# Patient Record
Sex: FEMALE | Race: WHITE | NOT HISPANIC OR LATINO | Employment: FULL TIME | URBAN - NONMETROPOLITAN AREA
[De-identification: names, ages, dates, MRNs, and addresses within clinical notes are randomized per-mention and may not be internally consistent; named-entity substitution may affect disease eponyms.]

---
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
## 2021-06-19 ENCOUNTER — OFFICE VISIT (OUTPATIENT)
Dept: FAMILY MEDICINE | Facility: OTHER | Age: 21
End: 2021-06-19
Attending: SURGERY
Payer: COMMERCIAL

## 2021-06-19 VITALS
HEART RATE: 92 BPM | BODY MASS INDEX: 26.94 KG/M2 | TEMPERATURE: 97.6 F | SYSTOLIC BLOOD PRESSURE: 118 MMHG | WEIGHT: 146.4 LBS | DIASTOLIC BLOOD PRESSURE: 80 MMHG | HEIGHT: 62 IN | OXYGEN SATURATION: 97 % | RESPIRATION RATE: 18 BRPM

## 2021-06-19 DIAGNOSIS — H66.003 NON-RECURRENT ACUTE SUPPURATIVE OTITIS MEDIA OF BOTH EARS WITHOUT SPONTANEOUS RUPTURE OF TYMPANIC MEMBRANES: Primary | ICD-10-CM

## 2021-06-19 DIAGNOSIS — H61.23 BILATERAL IMPACTED CERUMEN: ICD-10-CM

## 2021-06-19 PROCEDURE — 99213 OFFICE O/P EST LOW 20 MIN: CPT | Performed by: NURSE PRACTITIONER

## 2021-06-19 ASSESSMENT — MIFFLIN-ST. JEOR: SCORE: 1379.38

## 2021-06-19 ASSESSMENT — PAIN SCALES - GENERAL: PAINLEVEL: MILD PAIN (3)

## 2021-06-19 NOTE — NURSING NOTE
"Chief Complaint   Patient presents with     Ear Problem     bilateral ear pain started a few days ago. Yesterday she woke up at 1 am crying from the pain.     Initial There were no vitals taken for this visit. Estimated body mass index is 24.72 kg/m  as calculated from the following:    Height as of 4/21/16: 1.562 m (5' 1.5\").    Weight as of 4/24/16: 60.3 kg (133 lb).         Medication Reconciliation: Complete      Jovan Topete LPN   "

## 2021-06-19 NOTE — PROGRESS NOTES
"ASSESSMENT/PLAN:  1. Non-recurrent acute suppurative otitis media of both ears without spontaneous rupture of tympanic membranes    - amoxicillin-clavulanate (AUGMENTIN) 875-125 MG tablet; Take 1 tablet by mouth 2 times daily for 10 days  Dispense: 20 tablet; Refill: 0    2. Bilateral impacted cerumen    - REMOVE IMPACTED CERUMEN      COSMO Aldana performed an ear lavage of bilateral auditory canals, this was successful.     Discussed with the patient that she appears to have otitis media of the left TM with the start of otitis media of the right TM. The patient was prescribed Augmentin BID x 10 days for otitis media.     Encouraged fluids    May use over-the-counter Tylenol or ibuprofen PRN    Discussed warning signs/symptoms indicative of need to f/u    Follow up if symptoms persist or worsen or concerns      I explained my diagnostic considerations and recommendations to the patient, who voiced understanding and agreement with the treatment plan. All questions were answered. We discussed potential side effects of any prescribed or recommended therapies, as well as expectations for response to treatments.        HPI:    Rylee A Mindrum is a 20 year old female  who presents to [facility] today for bilateral otalgia. The patient presents to the clinic today with her mother. The symptoms started a couple of days ago. However, the pain worsened last night. \"states that it feels bubbly and has discomfort\". Stated that it was a sharper pain last night. The patient has a history of bilateral PE tubes years ago. History of otitis media as a child. Denies fevers. The patient has been having rhinitis and congestion due to having a cat in her home. She took ibuprofen today for the pain, this did help.     Past Medical History:   Diagnosis Date     Ankyloglossia     Tight frenulum under her tongue     Otitis media     12/14/04,Presumed otitis media-ear pain with cerumen occluding tympanic membranes, treated with amoxicillin " "and ciprofloxacin drops     Past Surgical History:   Procedure Laterality Date     ADENOIDECTOMY      No Comments Provided     MYRINGOTOMY, INSERT TUBE, COMBINED      06/04, 2005     OTHER SURGICAL HISTORY      [id number],FRENOTOMY,Tongue frenulum clipping     Social History     Tobacco Use     Smoking status: Never Smoker     Smokeless tobacco: Never Used   Substance Use Topics     Alcohol use: No     Current Outpatient Medications   Medication Sig Dispense Refill     fexofenadine (ALLEGRA) 180 MG tablet Take 180 mg by mouth daily with food       Allergies   Allergen Reactions     Seasonal Other (See Comments)         Past medical history, past surgical history, current medications and allergies reviewed and accurate to the best of my knowledge.        ROS:  Refer to HPI    /80   Pulse 92   Temp 97.6  F (36.4  C) (Tympanic)   Resp 18   Ht 1.562 m (5' 1.5\")   Wt 66.4 kg (146 lb 6.4 oz)   LMP 06/12/2021   SpO2 97%   BMI 27.21 kg/m      EXAM:  General Appearance: Well appearing female, appropriate appearance for age. No acute distress  Ears: Left TM intact, unable to visualize bony landmarks, erythema present, bulging present, yellow purulence noted behind left TM.  Right TM intact, unable to visualize bony landmarks, erythema present,no bulging, no purulence.  Left auditory canal cerumen present.  Right auditory canal cerumen present.  Normal external ears, non tender.  Orophayrnx: moist mucous membranes, posterior pharynx without erythema, tonsils without hypertrophy, no erythema, no exudates or petechiae, no post nasal drip seen, no trismus, voice clear.    Neck: supple without adenopathy  Respiratory: normal chest wall and respirations.  Normal effort.  Clear to auscultation bilaterally, no wheezing, crackles or rhonchi.  No increased work of breathing.  No cough appreciated.  Cardiac: RRR with no murmurs  Psychological: normal affect, alert, oriented, and pleasant.         "

## 2021-06-19 NOTE — PATIENT INSTRUCTIONS
Patient Education     Common Middle Ear Problems  Middle ear problems may be caused by something that occurs at birth or right after birth (congenital). This may be an inherited condition. Or it may be due to medicines or to a viral infection such as hepatitis, HIV, syphilis, or cytomegalovirus. Over time, certain growths or bone disease can also harm the middle ear. Left untreated, middle ear problems often lead to lifelong (permanent) hearing loss.  The ear has 3 main parts: the outer ear, middle ear, and inner ear. The middle ear is made up of:    The eardrum (tympanic membrane)    An air-filled space with bones (ossicles) that link the eardrum to the inner ear     There are 3 types of hearing loss:    Conductive hearing loss. This is caused by anything that limits outside sound from getting into the inner ear.    Sensorineural hearing loss. This type affects the inner ear (cochlea) or the auditory nerve.    Mixed hearing loss. This is a combination of conductive and sensorineural hearing loss.    Injury, infection, certain growths, or bone disease can cause your symptoms. A ruptured eardrum or a long-lasting (chronic) ear infection may be painful and decrease hearing.  Symptoms    Hearing loss in one or both ears    Fluid, often smelly, draining from the ear    Mild pain or pressure in the ear    Ringing in the ear    Types of hearing loss  Conductive hearing loss  Sound waves may be disrupted before they get to the inner ear. If this happens, you may have conductive hearing loss. Loud sounds may be muffled. And it can be hard to hear soft sounds.  Causes can include:    Fluid in the middle ear    Ear infection (otitis media)    Infection in the ear canal (swimmer s ear or external otitis)    Earwax in the ear canal    Noncancer (benign) tumors blocking the outer or middle ear    A hole in your eardrum (perforated eardrum)    Something stuck in the outer ear    Structural problem in the outer or middle  ear  This type of hearing loss can often be treated with medicine or surgery.  Sensorineural hearing loss  This is the most common type of lifelong hearing loss. It occurs after damage to the inner ear. It can also occur when there are problems with the nerves that travel from the inner ear to the brain. You may find it hard to hear soft sounds. Louder sounds may not be clear. Or they may be muffled.  Causes can include:    Illnesses    Certain medicines that damage the ear    Age-related hearing loss (presbycusis)    Family history of hearing loss    Head injury    Loud noise exposure    Structural problem in the inner ear  In some cases it may be hard to know the cause of sensorineural hearing loss. Some metabolic disorders, such as diabetes, have been linked to it. If your hearing loss is unexplained, you may need tests to help find the cause. These can include:    Blood sugar level tests    Complete blood count (CBC)    Thyroid function tests    Syphilis blood tests  In most cases, a sensorineural hearing loss can t be fixed with medicine or surgery. Hearing aids may be needed.  Mixed hearing loss  This type occurs when a conductive hearing loss happens at the same time as a sensorineural hearing loss. It is a problem in your outer or middle ear and in your inner ear. You may not hear as well in one or both ears.  Treatment for mixed hearing loss may be a combination of medicine or surgery, as well as hearing aids.  Radha last reviewed this educational content on 7/1/2019 2000-2021 [employer]. All rights reserved. This information is not intended as a substitute for professional medical care. Always follow your healthcare professional's instructions.

## 2022-02-23 ENCOUNTER — TRANSFERRED RECORDS (OUTPATIENT)
Dept: MULTI SPECIALTY CLINIC | Facility: CLINIC | Age: 22
End: 2022-02-23

## 2022-02-23 LAB
HPV ABSTRACT: NORMAL
PAP-ABSTRACT: NORMAL

## 2023-01-31 ENCOUNTER — TRANSFERRED RECORDS (OUTPATIENT)
Dept: MULTI SPECIALTY CLINIC | Facility: CLINIC | Age: 23
End: 2023-01-31

## 2023-01-31 LAB
C TRACH DNA SPEC QL PROBE+SIG AMP: NOT DETECTED
N GONORRHOEA DNA SPEC QL PROBE+SIG AMP: NOT DETECTED
SPECIMEN DESCRIP: NORMAL
SPECIMEN DESCRIPTION: NORMAL

## 2023-09-26 ENCOUNTER — OFFICE VISIT (OUTPATIENT)
Dept: FAMILY MEDICINE | Facility: OTHER | Age: 23
End: 2023-09-26
Attending: STUDENT IN AN ORGANIZED HEALTH CARE EDUCATION/TRAINING PROGRAM
Payer: COMMERCIAL

## 2023-09-26 VITALS
BODY MASS INDEX: 32.12 KG/M2 | OXYGEN SATURATION: 100 % | RESPIRATION RATE: 16 BRPM | SYSTOLIC BLOOD PRESSURE: 114 MMHG | DIASTOLIC BLOOD PRESSURE: 76 MMHG | HEART RATE: 101 BPM | TEMPERATURE: 97.6 F | WEIGHT: 172.8 LBS

## 2023-09-26 DIAGNOSIS — J02.9 ACUTE PHARYNGITIS, UNSPECIFIED ETIOLOGY: Primary | ICD-10-CM

## 2023-09-26 DIAGNOSIS — J02.0 STREPTOCOCCAL PHARYNGITIS: ICD-10-CM

## 2023-09-26 LAB
GROUP A STREP BY PCR: DETECTED
SARS-COV-2 RNA RESP QL NAA+PROBE: NEGATIVE

## 2023-09-26 PROCEDURE — G0463 HOSPITAL OUTPT CLINIC VISIT: HCPCS | Performed by: NURSE PRACTITIONER

## 2023-09-26 PROCEDURE — C9803 HOPD COVID-19 SPEC COLLECT: HCPCS | Performed by: NURSE PRACTITIONER

## 2023-09-26 PROCEDURE — 87651 STREP A DNA AMP PROBE: CPT | Mod: ZL | Performed by: NURSE PRACTITIONER

## 2023-09-26 PROCEDURE — 87635 SARS-COV-2 COVID-19 AMP PRB: CPT | Mod: ZL | Performed by: NURSE PRACTITIONER

## 2023-09-26 PROCEDURE — 99203 OFFICE O/P NEW LOW 30 MIN: CPT | Performed by: NURSE PRACTITIONER

## 2023-09-26 RX ORDER — PENICILLIN V POTASSIUM 500 MG/1
500 TABLET, FILM COATED ORAL 2 TIMES DAILY
Qty: 20 TABLET | Refills: 0 | Status: SHIPPED | OUTPATIENT
Start: 2023-09-26 | End: 2023-10-06

## 2023-09-26 RX ORDER — HYDROCORTISONE 25 MG/G
CREAM TOPICAL
COMMUNITY
Start: 2022-07-19

## 2023-09-26 ASSESSMENT — PAIN SCALES - GENERAL: PAINLEVEL: MODERATE PAIN (4)

## 2023-09-26 NOTE — PROGRESS NOTES
ASSESSMENT/PLAN:    I have reviewed the nursing notes.  I have reviewed the findings, diagnosis, plan and need for follow up with the patient.    1. Acute pharyngitis, unspecified etiology  - Symptomatic COVID-19 Virus (Coronavirus) by PCR Nose  - Group A Streptococcus PCR Throat Swab  Strep and COVID test pending.  If positive for strep we will treat antibiotics.  If positive for COVID can discuss antiviral treatment patient.  She is 6 weeks pregnant.  Discussed that if she is positive she will need to tell her OB at her first appointment as additional ultrasounds are often recommended in women who have had COVID in early pregnancy.    - Symptomatic treatment - Encouraged fluids, salt water gargles, honey (only if greater than 1 year in age due to risk of botulism), elevation, humidifier, sinus rinse/netti pot, lozenges, tea, topical vapor rub, popsicles, rest, etc   -May use over-the-counter Tylenol or ibuprofen PRN    2. Streptococcal pharyngitis  - penicillin V (VEETID) 500 MG tablet; Take 1 tablet (500 mg) by mouth 2 times daily for 10 days  Dispense: 20 tablet; Refill: 0      Follow up if symptoms persist or worsen or concerns    I explained my diagnostic considerations and recommendations to the patient, who voiced understanding and agreement with the treatment plan. All questions were answered. We discussed potential side effects of any prescribed or recommended therapies, as well as expectations for response to treatments.    Britney Oh NP  9/26/2023  4:27 PM    HPI:  Rylee A Mindrum is a 22 year old female who presents to [facility] today for concerns of sore throat. She is 6 weeks pregnant. Has been gaggy with pregnancy. She is here with her mom today. Works at [employer] and there has been a lot of strep around the classroom.   No fevers.   Slight cough. No shortness of breath. Has not taken a covid test and is agreeable to this.     ROS Otherwise negative.     Past Medical History:   Diagnosis Date     Ankyloglossia     Tight frenulum under her tongue    Otitis media     12/14/04,Presumed otitis media-ear pain with cerumen occluding tympanic membranes, treated with amoxicillin and ciprofloxacin drops     Past Surgical History:   Procedure Laterality Date    ADENOIDECTOMY      No Comments Provided    MYRINGOTOMY, INSERT TUBE, COMBINED      06/04, 2005    OTHER SURGICAL HISTORY      158124,FRENOTOMY,Tongue frenulum clipping     Social History     Tobacco Use    Smoking status: Never    Smokeless tobacco: Never   Substance Use Topics    Alcohol use: No     Current Outpatient Medications   Medication Sig Dispense Refill    fexofenadine (ALLEGRA) 180 MG tablet Take 180 mg by mouth daily with food      hydrocortisone, Perianal, (ANUSOL-HC) 2.5 % cream Insert  into the rectum as needed for Other (hemorrhoids).      penicillin V (VEETID) 500 MG tablet Take 1 tablet (500 mg) by mouth 2 times daily for 10 days 20 tablet 0     Allergies   Allergen Reactions    No Clinical Screening - See Comments Other (See Comments)    Seasonal Other (See Comments)     Past medical history, past surgical history, current medications and allergies reviewed and accurate to the best of my knowledge.      ROS:  Refer to HPI    /76   Pulse 101   Temp 97.6  F (36.4  C) (Tympanic)   Resp 16   Wt 78.4 kg (172 lb 12.8 oz)   SpO2 100%   BMI 32.12 kg/m      EXAM:  General Appearance: Well appearing 22 year old female, appropriate appearance for age. No acute distress   Ears: Left TM intact, translucent with bony landmarks appreciated, no erythema, no effusion, no bulging, no purulence.  Right TM intact, translucent with bony landmarks appreciated, no erythema, no effusion, no bulging, no purulence.  Left auditory canal clear.  Right auditory canal clear.  Normal external ears, non tender.  Eyes: conjunctivae normal without erythema or irritation, corneas clear, no drainage or crusting, no eyelid swelling, pupils equal   Oropharynx: moist  mucous membranes,  + posterior pharynx with erythema, tonsils symmetric, no erythema, no exudates or petechiae, no post nasal drip seen, no trismus, voice clear.    Nose:  Bilateral nares: no erythema, no edema, no drainage or congestion   Neck: supple without adenopathy  Respiratory: normal chest wall and respirations.  Normal effort.  Clear to auscultation bilaterally, no wheezing, crackles or rhonchi.  No increased work of breathing.  No cough appreciated.  Cardiac: RRR with no murmurs  Musculoskeletal:  Equal movement of bilateral upper extremities.  Equal movement of bilateral lower extremities.  Normal gait.    Dermatological: no rashes noted of exposed skin  Neuro: Alert and oriented to person, place, and time.    Psychological: normal affect, alert, oriented, and pleasant.     Results for orders placed or performed in visit on 09/26/23   Symptomatic COVID-19 Virus (Coronavirus) by PCR Nose     Status: Normal    Specimen: Nose; Swab   Result Value Ref Range    SARS CoV2 PCR Negative Negative    Narrative    Testing was performed using the Xpert Xpress SARS-CoV-2 Assay on the [employer] Gene[employer]Xpert Instrument Systems. Additional information about this Emergency Use Authorization (EUA) assay can be found via the Lab Guide. This test should be ordered for the detection of SARS-CoV-2 in individuals who meet SARS-CoV-2 clinical and/or epidemiological criteria as well as from individuals without symptoms or other reasons to suspect COVID-19. Test performance for asymptomatic patients has only been established in anterior nasal swab specimens. This test is for in vitro diagnostic use under the FDA EUA for laboratories certified under CLIA to perform high complexity testing. This test has not been FDA cleared or approved. A negative result does not rule out the presence of PCR inhibitors in the specimen or target RNA concentration below the limit of detection for the assay. The possibility of a false negative should be  considered if the patient's recent exposure or clinical presentation suggests COVID-19. This test was validated by [facility]. This laboratory is certified under the Clinical Laboratory Improvement Amendments (CLIA) as qualified to perform high complexity clinical laboratory testing.   Group A Streptococcus PCR Throat Swab     Status: Abnormal    Specimen: Throat; Swab   Result Value Ref Range    Group A strep by PCR Detected (A) Not Detected    Narrative    The Xpert Xpress Strep A test, performed on the [employer] Systems, is a rapid, qualitative in vitro diagnostic test for the detection of Streptococcus pyogenes (Group A ß-hemolytic Streptococcus, Strep A) in throat swab specimens from patients with signs and symptoms of pharyngitis. The Xpert Xpress Strep A test can be used as an aid in the diagnosis of Group A Streptococcal pharyngitis. The assay is not intended to monitor treatment for Group A Streptococcus infections. The Xpert Xpress Strep A test utilizes an automated real-time polymerase chain reaction (PCR) to detect Streptococcus pyogenes DNA.

## 2023-09-26 NOTE — NURSING NOTE
Patient presents to clinic for concern of sore throat  /76   Pulse 101   Temp 97.6  F (36.4  C) (Tympanic)   Resp 16   Wt 78.4 kg (172 lb 12.8 oz)   SpO2 100%   BMI 32.12 kg/m    Erika Parks LPN on 9/26/2023 at 4:20 PM

## 2024-03-16 ENCOUNTER — HOSPITAL ENCOUNTER (OUTPATIENT)
Facility: OTHER | Age: 24
Discharge: HOME | End: 2024-03-16
Attending: OBSTETRICS & GYNECOLOGY | Admitting: OBSTETRICS & GYNECOLOGY
Payer: COMMERCIAL

## 2024-03-16 ENCOUNTER — HOSPITAL ENCOUNTER (OUTPATIENT)
Facility: OTHER | Age: 24
End: 2024-03-16
Admitting: OBSTETRICS & GYNECOLOGY
Payer: COMMERCIAL

## 2024-03-16 VITALS
BODY MASS INDEX: 32.47 KG/M2 | TEMPERATURE: 97.6 F | SYSTOLIC BLOOD PRESSURE: 111 MMHG | HEART RATE: 96 BPM | DIASTOLIC BLOOD PRESSURE: 56 MMHG | RESPIRATION RATE: 16 BRPM | OXYGEN SATURATION: 98 % | HEIGHT: 61 IN | WEIGHT: 172 LBS

## 2024-03-16 DIAGNOSIS — Z36.89 ENCOUNTER FOR TRIAGE IN PREGNANT PATIENT: Primary | ICD-10-CM

## 2024-03-16 LAB
ALBUMIN UR-MCNC: NEGATIVE MG/DL
APPEARANCE UR: CLEAR
BILIRUB UR QL STRIP: NEGATIVE
COLOR UR AUTO: YELLOW
GLUCOSE UR STRIP-MCNC: NEGATIVE MG/DL
HGB UR QL STRIP: NEGATIVE
KETONES UR STRIP-MCNC: NEGATIVE MG/DL
LEUKOCYTE ESTERASE UR QL STRIP: NEGATIVE
NITRATE UR QL: NEGATIVE
PH UR STRIP: 7 [PH] (ref 5–9)
SP GR UR STRIP: 1.01 (ref 1–1.03)
UROBILINOGEN UR STRIP-MCNC: NORMAL MG/DL

## 2024-03-16 PROCEDURE — 999N000104 HC STATISTIC NO CHARGE: Performed by: FAMILY MEDICINE

## 2024-03-16 PROCEDURE — G0463 HOSPITAL OUTPT CLINIC VISIT: HCPCS

## 2024-03-16 PROCEDURE — 250N000011 HC RX IP 250 OP 636: Performed by: OBSTETRICS & GYNECOLOGY

## 2024-03-16 PROCEDURE — 81003 URINALYSIS AUTO W/O SCOPE: CPT | Performed by: OBSTETRICS & GYNECOLOGY

## 2024-03-16 RX ORDER — LIDOCAINE 40 MG/G
CREAM TOPICAL
Status: DISCONTINUED | OUTPATIENT
Start: 2024-03-16 | End: 2024-03-16 | Stop reason: HOSPADM

## 2024-03-16 RX ORDER — ONDANSETRON 4 MG/1
4 TABLET, ORALLY DISINTEGRATING ORAL EVERY 6 HOURS PRN
Status: DISCONTINUED | OUTPATIENT
Start: 2024-03-16 | End: 2024-03-16 | Stop reason: HOSPADM

## 2024-03-16 RX ORDER — VITAMIN A ACETATE, .BETA.-CAROTENE, ASCORBIC ACID, CHOLECALCIFEROL, .ALPHA.-TOCOPHEROL ACETATE, DL-, THIAMINE MONONITRATE, RIBOFLAVIN, NIACINAMIDE, PYRIDOXINE HYDROCHLORIDE, FOLIC ACID, CYANOCOBALAMIN, CALCIUM CARBONATE, FERROUS FUMARATE, ZINC OXIDE, AND CUPRIC OXIDE 2000; 2000; 120; 400; 22; 1.84; 3; 20; 10; 1; 12; 200; 27; 25; 2 [IU]/1; [IU]/1; MG/1; [IU]/1; MG/1; MG/1; MG/1; MG/1; MG/1; MG/1; UG/1; MG/1; MG/1; MG/1; MG/1
1 TABLET ORAL DAILY
COMMUNITY

## 2024-03-16 RX ORDER — DIPHENHYDRAMINE HCL 12.5 MG/5ML
25 SOLUTION ORAL 4 TIMES DAILY PRN
COMMUNITY

## 2024-03-16 RX ADMIN — ONDANSETRON 4 MG: 4 TABLET, ORALLY DISINTEGRATING ORAL at 20:16

## 2024-03-16 ASSESSMENT — COLUMBIA-SUICIDE SEVERITY RATING SCALE - C-SSRS
1. IN THE PAST MONTH, HAVE YOU WISHED YOU WERE DEAD OR WISHED YOU COULD GO TO SLEEP AND NOT WAKE UP?: NO
2. HAVE YOU ACTUALLY HAD ANY THOUGHTS OF KILLING YOURSELF IN THE PAST MONTH?: NO
6. HAVE YOU EVER DONE ANYTHING, STARTED TO DO ANYTHING, OR PREPARED TO DO ANYTHING TO END YOUR LIFE?: NO

## 2024-03-16 ASSESSMENT — ACTIVITIES OF DAILY LIVING (ADL): ADLS_ACUITY_SCORE: 35

## 2024-03-17 NOTE — ED TRIAGE NOTES
"Patient presents 30 weeks pregnant with complains of a headache, blurry vision, nausea, dizziness today.  Patient denies headache and blurry vision at this time, but states she has some dizziness and vomited once today.  Patient then reports blurry vision and headache when she awakes in the morning for the last two weeks and states she has not felt the baby move today.  Patients main concern is decreased fetal movement and worried about preeclampsia as well.    /84   Pulse 104   Temp 97.7  F (36.5  C) (Temporal)   Resp 18   Ht 1.549 m (5' 1\")   Wt 78 kg (172 lb)   SpO2 98%   BMI 32.50 kg/m         Triage Assessment (Adult)       Row Name 03/16/24 1928          Triage Assessment    Airway WDL WDL        Respiratory WDL    Respiratory WDL WDL        Skin Circulation/Temperature WDL    Skin Circulation/Temperature WDL WDL        Cardiac WDL    Cardiac WDL WDL        Peripheral/Neurovascular WDL    Peripheral Neurovascular WDL WDL        Cognitive/Neuro/Behavioral WDL    Cognitive/Neuro/Behavioral WDL WDL                     "

## 2024-03-17 NOTE — PROGRESS NOTES
Data: [facility] presented [facility]: 3/16/2024  7:37 PM.  Reason for maternal/fetal assessment is decreased fetal movement. Patient reports decreased fetal movement for the last two days, increase in headaches, blurred vision, and nausea with 1 emesis this evening at 6 pm. Patient denies uterine contractions, leaking of vaginal fluid/rupture of membranes, vaginal bleeding, abdominal pain, pelvic pressure, epigastric or RUQ pain, significant edema. Patient reports fetal movement is decreased. Patient is a 30w3d . Prenatal record reviewed. Pregnancy has been uncomplicated. Support person is present.     Fetal HR baseline was 150, variability is minimal. Accelerations: absent. Decelerations: absent. Cervical exam deferred.     Vital signs wnl. Patient reports no pain and is coping.

## 2024-03-17 NOTE — PROGRESS NOTES
Provider notified regarding pt arrival to the unit. Requested an order for Zofran, PRN Zofran now available. Informed of UA results. Provider would like a BPP if FHR variability continues to be minimal.

## 2024-03-17 NOTE — PROGRESS NOTES
Pt feeling better after Zofran. Has been marking fetal movements on the monitor. FHR 140s, moderate variability, accelerations present, decelerations absent. MD okay for pt to be discharged. Provided education on nausea and kick counting.

## 2024-05-15 ENCOUNTER — HOSPITAL ENCOUNTER (OUTPATIENT)
Facility: OTHER | Age: 24
Discharge: HOME | End: 2024-05-15
Attending: STUDENT IN AN ORGANIZED HEALTH CARE EDUCATION/TRAINING PROGRAM | Admitting: STUDENT IN AN ORGANIZED HEALTH CARE EDUCATION/TRAINING PROGRAM
Payer: COMMERCIAL

## 2024-05-15 VITALS
SYSTOLIC BLOOD PRESSURE: 114 MMHG | HEIGHT: 61 IN | BODY MASS INDEX: 34.36 KG/M2 | OXYGEN SATURATION: 97 % | DIASTOLIC BLOOD PRESSURE: 79 MMHG | WEIGHT: 182 LBS

## 2024-05-15 PROCEDURE — G0463 HOSPITAL OUTPT CLINIC VISIT: HCPCS | Performed by: STUDENT IN AN ORGANIZED HEALTH CARE EDUCATION/TRAINING PROGRAM

## 2024-05-15 PROCEDURE — 96360 HYDRATION IV INFUSION INIT: CPT

## 2024-05-15 PROCEDURE — 258N000003 HC RX IP 258 OP 636: Performed by: STUDENT IN AN ORGANIZED HEALTH CARE EDUCATION/TRAINING PROGRAM

## 2024-05-15 PROCEDURE — 99204 OFFICE O/P NEW MOD 45 MIN: CPT | Performed by: STUDENT IN AN ORGANIZED HEALTH CARE EDUCATION/TRAINING PROGRAM

## 2024-05-15 RX ORDER — LIDOCAINE 40 MG/G
CREAM TOPICAL
Status: DISCONTINUED | OUTPATIENT
Start: 2024-05-15 | End: 2024-05-15 | Stop reason: HOSPADM

## 2024-05-15 RX ORDER — HYDROGEN PEROXIDE 2.65 ML/100ML
1 LIQUID ORAL; TOPICAL
COMMUNITY
Start: 2024-03-22

## 2024-05-15 RX ADMIN — SODIUM CHLORIDE, POTASSIUM CHLORIDE, SODIUM LACTATE AND CALCIUM CHLORIDE 1000 ML: 600; 310; 30; 20 INJECTION, SOLUTION INTRAVENOUS at 18:44

## 2024-05-15 ASSESSMENT — ACTIVITIES OF DAILY LIVING (ADL)
ADLS_ACUITY_SCORE: 35
ADLS_ACUITY_SCORE: 35

## 2024-05-15 NOTE — H&P
[facility] Admission H&P    Rylee A Mindrum MRN# [id number]   Age: 23 year old YOB: 2000     Date of Admission:  5/15/2024    Primary care provider: No Ref-Primary, Physician           Chief Complaint:   Rylee A Mindrum is a 23 year old  at 39w0d presented for decreased fetal movement.          Pregnancy history:   This pregnancy was an IUI pregnancy. She has been getting all of her care at [facility] and has an induction planned for tomorrow. We do not have access to their records, so the patient filled me in on the progress of her pregnancy thus far.     She notes that she felt decreased fetal movements this afternoon, but now feels movements when she has arrived to our L&D floor.     She denies any LOF or vaginal bleeding.     She notes she has not had anything to eat today except for breakfast.     In discussion of her pregnancy she reports this is an IUI  pregnancy. She is GBS+, she passed her GCT and has had normal anatomy US. She has been taking ASA 81 mg daily and prenatal vitamin.      OBSTETRIC HISTORY:    OB History    Para Term  AB Living   1 0 0 0 0 0   SAB IAB Ectopic Multiple Live Births   0 0 0 0 0      # Outcome Date GA Lbr Hemal/2nd Weight Sex Type Anes PTL Lv   1 Current                PRENATAL LABS:   Lab Results   Component Value Date    HGB 14.9 10/17/2013         MEDICATIONS:  Medications Prior to Admission   Medication Sig Dispense Refill Last Dose    diphenhydrAMINE (BENADRYL) 12.5 MG/5ML liquid Take 25 mg by mouth 4 times daily as needed for allergies or sleep   Past Month    EQ ASPIRIN ADULT LOW DOSE 81 MG EC tablet Take 1 tablet by mouth daily at 2 pm       fexofenadine (ALLEGRA) 180 MG tablet Take 180 mg by mouth daily with food   More than a month    hydrocortisone, Perianal, (ANUSOL-HC) 2.5 % cream Insert  into the rectum as needed for Other (hemorrhoids).   Past Month    Prenatal Vit-Fe Fumarate-FA (PNV PRENATAL PLUS  "MULTIVITAMIN) 27-1 MG TABS per tablet Take 1 tablet by mouth daily   5/15/2024   .        Maternal Past Medical History:     Past Medical History:   Diagnosis Date    Ankyloglossia     Tight frenulum under her tongue    Otitis media     04,Presumed otitis media-ear pain with cerumen occluding tympanic membranes, treated with amoxicillin and ciprofloxacin drops       SURGICAL HISTORY:  Past Surgical History:   Procedure Laterality Date    ADENOIDECTOMY      No Comments Provided    MYRINGOTOMY, INSERT TUBE, COMBINED      2005    OTHER SURGICAL HISTORY      ,FRENOTOMY,Tongue frenulum clipping         ALLERGIES:     Allergies   Allergen Reactions    No Clinical Screening - See Comments Other (See Comments)    Seasonal Other (See Comments)            Physical Exam:   Patient Vitals for the past 8 hrs:   Height Weight   05/15/24 1730 1.549 m (5' 1\") 82.6 kg (182 lb)     Gen: Alert and oriented, No acute distress, well-appearing  CV: Normal heart rate   Resp:  non-labored respirations  Abd: Soft, gravid, intermittent contractions palpated, no point tenderness  Ext: No sign of asymmetric edema, erythema, or asymmetric size. No pain with movement, Negative Lindy's sign    Cervix: declined exam  Membranes: intact      FHT: 165 bpm baseline, moderate  variability, + accelerations, no decelerations (Cat 2)    Mild tachycardia on presentation  [place]: 1 per 10 minutes  SVE /-3, posterior        Assessment:   Rylee A Mindrum is a 23 year old  at 39w0d here for evaluation and observation with decreased fetal movements, improved but Cat 2 FHT with mild fetal tachycardia.      Plan:     -# Plan  -- Observe and fluid hydrate in the setting of mild fetal tachycardia  -- Fetal movements palpated by team and mom, can be heard with doppler monitoring as well  -- If tachycardia improves with fluid hydration, can discharge to home  -- SVE if she starts to feel these intermittent contractions  -- Plan for IOL " tomorrow at [facility].    Jesenia Gibbs MD on 5/15/2024 at 6:26 PM

## 2024-05-15 NOTE — DISCHARGE INSTRUCTIONS
Please call [phone] overnight if you experience decreased fetal movement again, leaking of fluid- like your water broke, vaginal bleeding, contractions every 5 minutes lasting 60 seconds consistently for the last hour, or any other questions or concerns.

## 2024-05-16 NOTE — PROGRESS NOTES
[facility] DISCHARGE NOTE    Patient discharged to home at 7:57 PM via ambulation. Accompanied by mother and staff. Discharge instructions reviewed with patient, opportunity offered to ask questions. Prescriptions - None ordered for discharge. All belongings sent with patient.      Wicho Damico RN

## 2024-05-16 NOTE — PROGRESS NOTES
Patient arrived from home with her mother.  She stated that she was having decreased fetal movement and that she had only felt her baby move 2 times since this morning.  She had not had much to eat or drink today.  This is an IUI pregnancy. She states she has a scheduled induction in [facility] tomorrow. She was placed on the EFM with FHR in the 140s-170s with irregular contractions which she states feels like cramping.

## (undated) RX ORDER — SODIUM CHLORIDE, SODIUM LACTATE, POTASSIUM CHLORIDE, CALCIUM CHLORIDE 600; 310; 30; 20 MG/100ML; MG/100ML; MG/100ML; MG/100ML
INJECTION, SOLUTION INTRAVENOUS
Status: DISPENSED
Start: 2024-05-15